# Patient Record
Sex: FEMALE | Race: WHITE | NOT HISPANIC OR LATINO
[De-identification: names, ages, dates, MRNs, and addresses within clinical notes are randomized per-mention and may not be internally consistent; named-entity substitution may affect disease eponyms.]

---

## 2018-04-25 PROBLEM — Z00.00 ENCOUNTER FOR PREVENTIVE HEALTH EXAMINATION: Status: ACTIVE | Noted: 2018-04-25

## 2018-05-10 ENCOUNTER — RECORD ABSTRACTING (OUTPATIENT)
Age: 68
End: 2018-05-10

## 2018-05-10 DIAGNOSIS — Z91.09 OTHER ALLERGY STATUS, OTHER THAN TO DRUGS AND BIOLOGICAL SUBSTANCES: ICD-10-CM

## 2018-05-10 DIAGNOSIS — Z82.49 FAMILY HISTORY OF ISCHEMIC HEART DISEASE AND OTHER DISEASES OF THE CIRCULATORY SYSTEM: ICD-10-CM

## 2018-05-10 DIAGNOSIS — Z86.718 PERSONAL HISTORY OF OTHER VENOUS THROMBOSIS AND EMBOLISM: ICD-10-CM

## 2018-05-10 DIAGNOSIS — Z88.9 ALLERGY STATUS TO UNSPECIFIED DRUGS, MEDICAMENTS AND BIOLOGICAL SUBSTANCES: ICD-10-CM

## 2018-05-10 DIAGNOSIS — S83.209A UNSPECIFIED TEAR OF UNSPECIFIED MENISCUS, CURRENT INJURY, UNSPECIFIED KNEE, INITIAL ENCOUNTER: ICD-10-CM

## 2018-05-22 ENCOUNTER — APPOINTMENT (OUTPATIENT)
Dept: BREAST CENTER | Facility: CLINIC | Age: 68
End: 2018-05-22
Payer: MEDICARE

## 2018-05-22 VITALS
WEIGHT: 240 LBS | HEIGHT: 64 IN | HEART RATE: 70 BPM | SYSTOLIC BLOOD PRESSURE: 162 MMHG | BODY MASS INDEX: 40.97 KG/M2 | DIASTOLIC BLOOD PRESSURE: 74 MMHG

## 2018-05-22 PROCEDURE — 99214 OFFICE O/P EST MOD 30 MIN: CPT

## 2018-08-28 ENCOUNTER — APPOINTMENT (OUTPATIENT)
Dept: BREAST CENTER | Facility: CLINIC | Age: 68
End: 2018-08-28
Payer: MEDICARE

## 2018-08-28 VITALS
DIASTOLIC BLOOD PRESSURE: 78 MMHG | BODY MASS INDEX: 39.15 KG/M2 | WEIGHT: 235 LBS | HEART RATE: 86 BPM | HEIGHT: 65 IN | SYSTOLIC BLOOD PRESSURE: 158 MMHG

## 2018-08-28 PROCEDURE — 99214 OFFICE O/P EST MOD 30 MIN: CPT

## 2018-08-28 RX ORDER — ADHESIVE TAPE 3"X 2.3 YD
50 MCG TAPE, NON-MEDICATED TOPICAL
Refills: 0 | Status: ACTIVE | COMMUNITY

## 2018-08-28 RX ORDER — ANASTROZOLE TABLETS 1 MG/1
1 TABLET ORAL
Qty: 90 | Refills: 0 | Status: ACTIVE | COMMUNITY
Start: 2018-08-06

## 2018-12-18 ENCOUNTER — APPOINTMENT (OUTPATIENT)
Dept: BREAST CENTER | Facility: CLINIC | Age: 68
End: 2018-12-18
Payer: MEDICARE

## 2018-12-18 VITALS
HEART RATE: 82 BPM | HEIGHT: 65 IN | BODY MASS INDEX: 39.99 KG/M2 | DIASTOLIC BLOOD PRESSURE: 81 MMHG | SYSTOLIC BLOOD PRESSURE: 143 MMHG | WEIGHT: 240 LBS

## 2018-12-18 DIAGNOSIS — Z86.79 PERSONAL HISTORY OF OTHER DISEASES OF THE CIRCULATORY SYSTEM: ICD-10-CM

## 2018-12-18 DIAGNOSIS — M19.90 UNSPECIFIED OSTEOARTHRITIS, UNSPECIFIED SITE: ICD-10-CM

## 2018-12-18 DIAGNOSIS — Z82.49 FAMILY HISTORY OF ISCHEMIC HEART DISEASE AND OTHER DISEASES OF THE CIRCULATORY SYSTEM: ICD-10-CM

## 2018-12-18 PROCEDURE — 99214 OFFICE O/P EST MOD 30 MIN: CPT

## 2018-12-18 RX ORDER — IBUPROFEN 800 MG/1
800 TABLET, FILM COATED ORAL
Qty: 30 | Refills: 0 | Status: DISCONTINUED | COMMUNITY
Start: 2018-05-24 | End: 2018-12-18

## 2018-12-18 RX ORDER — SILVER SULFADIAZINE 10 MG/G
1 CREAM TOPICAL
Qty: 85 | Refills: 0 | Status: DISCONTINUED | COMMUNITY
Start: 2018-07-19 | End: 2018-12-18

## 2018-12-18 RX ORDER — COLCHICINE 0.6 MG/1
0.6 TABLET ORAL
Qty: 20 | Refills: 0 | Status: DISCONTINUED | COMMUNITY
Start: 2018-06-16 | End: 2018-12-18

## 2018-12-18 RX ORDER — LIDOCAINE HYDROCHLORIDE 20 MG/ML
2 SOLUTION OROPHARYNGEAL
Qty: 240 | Refills: 0 | Status: DISCONTINUED | COMMUNITY
Start: 2018-05-16 | End: 2018-12-18

## 2018-12-18 RX ORDER — DICLOFENAC SODIUM 10 MG/G
1 GEL TOPICAL
Qty: 300 | Refills: 0 | Status: DISCONTINUED | COMMUNITY
Start: 2018-05-23 | End: 2018-12-18

## 2018-12-18 RX ORDER — TRAMADOL HYDROCHLORIDE 50 MG/1
50 TABLET, COATED ORAL
Qty: 30 | Refills: 0 | Status: DISCONTINUED | COMMUNITY
Start: 2018-05-23 | End: 2018-12-18

## 2018-12-18 RX ORDER — DESLORATADINE 5 MG/1
5 TABLET ORAL
Qty: 30 | Refills: 0 | Status: DISCONTINUED | COMMUNITY
Start: 2018-04-19 | End: 2018-12-18

## 2018-12-18 RX ORDER — METRONIDAZOLE 7.5 MG/G
0.75 CREAM TOPICAL
Qty: 45 | Refills: 0 | Status: DISCONTINUED | COMMUNITY
Start: 2018-05-07 | End: 2018-12-18

## 2019-04-10 ENCOUNTER — APPOINTMENT (OUTPATIENT)
Dept: BREAST CENTER | Facility: CLINIC | Age: 69
End: 2019-04-10
Payer: MEDICARE

## 2019-04-10 VITALS
DIASTOLIC BLOOD PRESSURE: 81 MMHG | WEIGHT: 232 LBS | HEART RATE: 74 BPM | HEIGHT: 65 IN | BODY MASS INDEX: 38.65 KG/M2 | SYSTOLIC BLOOD PRESSURE: 165 MMHG

## 2019-04-10 PROCEDURE — 99214 OFFICE O/P EST MOD 30 MIN: CPT

## 2019-04-10 RX ORDER — KETOCONAZOLE 20 MG/G
2 CREAM TOPICAL
Qty: 45 | Refills: 0 | Status: DISCONTINUED | COMMUNITY
Start: 2018-03-24 | End: 2019-04-10

## 2019-04-10 RX ORDER — TRIAMCINOLONE ACETONIDE 5 MG/G
0.5 OINTMENT TOPICAL
Qty: 30 | Refills: 0 | Status: DISCONTINUED | COMMUNITY
Start: 2018-05-07 | End: 2019-04-10

## 2019-04-10 RX ORDER — CLOTRIMAZOLE AND BETAMETHASONE DIPROPIONATE 10; .5 MG/G; MG/G
1-0.05 CREAM TOPICAL
Qty: 15 | Refills: 0 | Status: DISCONTINUED | COMMUNITY
Start: 2018-05-07 | End: 2019-04-10

## 2019-04-10 RX ORDER — HYDROCORTISONE VALERATE 2 MG/G
0.2 CREAM TOPICAL
Qty: 45 | Refills: 0 | Status: DISCONTINUED | COMMUNITY
Start: 2018-03-24 | End: 2019-04-10

## 2019-04-10 RX ORDER — OLOPATADINE HCL 1 MG/ML
0.1 SOLUTION/ DROPS OPHTHALMIC
Qty: 5 | Refills: 0 | Status: DISCONTINUED | COMMUNITY
Start: 2018-04-19 | End: 2019-04-10

## 2019-04-10 NOTE — PHYSICAL EXAM
[Normocephalic] : normocephalic [Supple] : supple [Atraumatic] : atraumatic [No Cervical Adenopathy] : no cervical adenopathy [No Supraclavicular Adenopathy] : no supraclavicular adenopathy [No dominant masses] : no dominant masses in right breast  [Examined in the supine and seated position] : examined in the supine and seated position [No dominant masses] : no dominant masses left breast [No Nipple Retraction] : no left nipple retraction [No Nipple Discharge] : no left nipple discharge [No Axillary Lymphadenopathy] : no right axillary lymphadenopathy [No Edema] : no edema [No Rashes] : no rashes [No Ulceration] : no ulceration [de-identified] : +diffuesely enlarged gland

## 2019-04-10 NOTE — HISTORY OF PRESENT ILLNESS
[FreeTextEntry1] : S/P L Re-Exc (10/20/17): +1mm resid DCIS, -margins\par S/P L PMX w/ NL/SLN (10/6/17): +1.2cm IDCA, 2.4cm DCIS, SBR II, -LVI, -margins IDCA, <1 mm sup and ant margins DCIS, -0/7 LN, ER+, RI+, Her2 -, Ki67: 40 %\par L Stage IA IDCA\par Completed CMF (Chiqui)(5/18)\par Completed RT (Tinger)(7/18)\par Started Arimidex/ASAb (8/18)\par L Br Ca detected on Scr Mammo (7/12/17)\par S/P L Sono Core Bx ( L 9:00)(7/21/17): +IDCA/DCIS, SBR II, ER+, RI+, Her2 -, Ki67: 24%\par +FH BR CA (Mother 69)\par +FH ? Melanoma (M. Aunt)\par S/P Moh's R temple for BCC (12/18)\par No other MH/FH changes. ROS reviewed/discussed. Taking Vit D. Last Bone Densitometry (7/17): "WNL"\par Mammo/Sono (9/5/18): NSF\par L Mammo/L Sono (4/30/19); NSF\par Thyroid Sono (4/3/19): +diffusely enlarged gland. No masses.

## 2019-08-21 ENCOUNTER — APPOINTMENT (OUTPATIENT)
Dept: BREAST CENTER | Facility: CLINIC | Age: 69
End: 2019-08-21
Payer: COMMERCIAL

## 2019-08-21 VITALS
BODY MASS INDEX: 40.97 KG/M2 | HEART RATE: 87 BPM | HEIGHT: 64 IN | DIASTOLIC BLOOD PRESSURE: 76 MMHG | SYSTOLIC BLOOD PRESSURE: 145 MMHG | WEIGHT: 240 LBS

## 2019-08-21 DIAGNOSIS — Z80.8 FAMILY HISTORY OF MALIGNANT NEOPLASM OF OTHER ORGANS OR SYSTEMS: ICD-10-CM

## 2019-08-21 PROCEDURE — 99214 OFFICE O/P EST MOD 30 MIN: CPT

## 2019-08-21 RX ORDER — ASPIRIN 81 MG/1
81 TABLET, CHEWABLE ORAL DAILY
Refills: 0 | Status: DISCONTINUED | COMMUNITY
End: 2019-08-21

## 2019-08-21 RX ORDER — CELECOXIB 200 MG/1
200 CAPSULE ORAL TWICE DAILY
Refills: 0 | Status: ACTIVE | COMMUNITY

## 2019-08-21 RX ORDER — RAMIPRIL 10 MG/1
10 CAPSULE ORAL DAILY
Refills: 0 | Status: ACTIVE | COMMUNITY

## 2019-08-21 RX ORDER — ASPIRIN 81 MG
81 TABLET, DELAYED RELEASE (ENTERIC COATED) ORAL
Refills: 0 | Status: ACTIVE | COMMUNITY

## 2019-08-21 NOTE — HISTORY OF PRESENT ILLNESS
[FreeTextEntry1] : S/P L Re-Exc (10/20/17): +1mm resid DCIS, -margins\par S/P L PMX w/ NL/SLN (10/6/17): +1.2cm IDCA, 2.4cm DCIS, SBR II, -LVI, -margins IDCA, <1 mm sup and ant margins DCIS, -0/7 LN, ER+, [place]+, Her2 -, Ki67: 40 %\par L Stage IA IDCA\par Completed CMF (Chiqui)(5/18)\par Completed RT (Tinger)(7/18)\par Started Arimidex/ASAb (8/18)\par L Br Ca detected on Scr Mammo (7/12/17)\par S/P L Sono Core Bx ( L 9:00)(7/21/17): +IDCA/DCIS, SBR II, ER+, [place]+, Her2 -, Ki67: 24%\par +FH BR CA (Mother 69)\par +FH ? Melanoma (M. Aunt)\par S/P Moh's R temple for BCC (12/18)\par No other MH/FH changes. ROS reviewed/discussed. Taking Vit D. Last Bone Densitometry (7/19): WNL but sig loss.\par Mammo/Sono (9/5/18): FG, NSF\par L Mammo/L Sono (4/30/19); FG, NSF\par Thyroid Sono (4/3/19): +diffusely enlarged gland. No masses.

## 2019-08-21 NOTE — PHYSICAL EXAM
[Normocephalic] : normocephalic [Atraumatic] : atraumatic [Supple] : supple [No Supraclavicular Adenopathy] : no supraclavicular adenopathy [No Cervical Adenopathy] : no cervical adenopathy [Normal Sinus Rhythm] : normal sinus rhythm [Examined in the supine and seated position] : examined in the supine and seated position [No dominant masses] : no dominant masses in right breast  [No dominant masses] : no dominant masses left breast [No Nipple Retraction] : no left nipple retraction [No Nipple Discharge] : no left nipple discharge [No Axillary Lymphadenopathy] : no left axillary lymphadenopathy [No Edema] : no edema [No Rashes] : no rashes [No Ulceration] : no ulceration [de-identified] : +diffuse thyromegaly [de-identified] : PMX/SLN/RT. NER. %. No lymphedema.

## 2019-12-04 ENCOUNTER — APPOINTMENT (OUTPATIENT)
Dept: BREAST CENTER | Facility: CLINIC | Age: 69
End: 2019-12-04
Payer: MEDICARE

## 2019-12-04 VITALS
HEART RATE: 74 BPM | BODY MASS INDEX: 39.49 KG/M2 | WEIGHT: 237 LBS | DIASTOLIC BLOOD PRESSURE: 77 MMHG | SYSTOLIC BLOOD PRESSURE: 150 MMHG | HEIGHT: 65 IN

## 2019-12-04 PROCEDURE — 99214 OFFICE O/P EST MOD 30 MIN: CPT

## 2019-12-04 NOTE — PHYSICAL EXAM
[Normocephalic] : normocephalic [Atraumatic] : atraumatic [Supple] : supple [No Supraclavicular Adenopathy] : no supraclavicular adenopathy [No Cervical Adenopathy] : no cervical adenopathy [No Thyromegaly] : no thyromegaly [Normal Sinus Rhythm] : normal sinus rhythm [Examined in the supine and seated position] : examined in the supine and seated position [No dominant masses] : no dominant masses in right breast  [No dominant masses] : no dominant masses left breast [No Nipple Retraction] : no left nipple retraction [No Nipple Discharge] : no left nipple discharge [No Axillary Lymphadenopathy] : no left axillary lymphadenopathy [No Edema] : no edema [No Rashes] : no rashes [No Ulceration] : no ulceration [de-identified] : S/P PMX/SLN/RT. NER. %. No lymphedema.

## 2019-12-04 NOTE — HISTORY OF PRESENT ILLNESS
[FreeTextEntry1] : S/P L Re-Exc (10/20/17): +1mm resid DCIS, -margins\par S/P L PMX w/ NL/SLN (10/6/17): +1.2cm IDCA, 2.4cm DCIS, SBR II, -LVI, -margins IDCA, <1 mm sup and ant margins DCIS, -0/7 LN, ER+, [place]+, Her2 -, Ki67: 40 %\par L Stage IA IDCA\par Completed CMF (Chiqui)(5/18)\par Completed RT (Tinger)(7/18)\par Started Arimidex/ASAb (8/18)\par L Br Ca detected on Scr Mammo (7/12/17)\par S/P L Sono Core Bx ( L 9:00)(7/21/17): +IDCA/DCIS, SBR II, ER+, ID+, Her2 -, Ki67: 24%\par +FH BR CA (Mother 69)\par +FH ? Melanoma (M. Aunt)\par S/P Moh's R temple for BCC (12/18)\par No other MH/FH changes. ROS reviewed/discussed. Taking Vit D. Last Bone Densitometry (7/19): WNL but sig loss.\par Mammo/Sono (9/23/19): FG, NSF\par L Mammo/L Sono (4/30/19); FG, NSF\par Thyroid Sono (4/3/19): +diffusely enlarged gland. No masses.

## 2020-04-15 ENCOUNTER — APPOINTMENT (OUTPATIENT)
Dept: BREAST CENTER | Facility: CLINIC | Age: 70
End: 2020-04-15

## 2020-08-30 ENCOUNTER — TRANSCRIPTION ENCOUNTER (OUTPATIENT)
Age: 70
End: 2020-08-30

## 2020-10-22 ENCOUNTER — APPOINTMENT (OUTPATIENT)
Dept: BREAST CENTER | Facility: CLINIC | Age: 70
End: 2020-10-22
Payer: MEDICARE

## 2020-10-22 VITALS — BODY MASS INDEX: 36.65 KG/M2 | WEIGHT: 220 LBS | HEIGHT: 65 IN

## 2020-10-22 PROCEDURE — 99214 OFFICE O/P EST MOD 30 MIN: CPT

## 2020-10-22 NOTE — PHYSICAL EXAM
[Normocephalic] : normocephalic [Atraumatic] : atraumatic [Supple] : supple [No Supraclavicular Adenopathy] : no supraclavicular adenopathy [No Cervical Adenopathy] : no cervical adenopathy [Normal Sinus Rhythm] : normal sinus rhythm [Examined in the supine and seated position] : examined in the supine and seated position [No dominant masses] : no dominant masses in right breast  [No dominant masses] : no dominant masses left breast [No Nipple Retraction] : no left nipple retraction [No Nipple Discharge] : no left nipple discharge [No Axillary Lymphadenopathy] : no left axillary lymphadenopathy [No Edema] : no edema [No Rashes] : no rashes [No Ulceration] : no ulceration [de-identified] : +stable thyromegaly [de-identified] : S/P PMX/SLN/RT. NER. %. No lymphedema.

## 2021-03-03 ENCOUNTER — APPOINTMENT (OUTPATIENT)
Dept: BREAST CENTER | Facility: CLINIC | Age: 71
End: 2021-03-03
Payer: MEDICARE

## 2021-03-03 VITALS
HEART RATE: 76 BPM | WEIGHT: 220 LBS | SYSTOLIC BLOOD PRESSURE: 168 MMHG | DIASTOLIC BLOOD PRESSURE: 79 MMHG | BODY MASS INDEX: 36.65 KG/M2 | HEIGHT: 65 IN

## 2021-03-03 DIAGNOSIS — Z12.31 ENCOUNTER FOR SCREENING MAMMOGRAM FOR MALIGNANT NEOPLASM OF BREAST: ICD-10-CM

## 2021-03-03 DIAGNOSIS — Z87.39 PERSONAL HISTORY OF OTHER DISEASES OF THE MUSCULOSKELETAL SYSTEM AND CONNECTIVE TISSUE: ICD-10-CM

## 2021-03-03 PROCEDURE — 99214 OFFICE O/P EST MOD 30 MIN: CPT

## 2021-03-03 NOTE — PHYSICAL EXAM
[Normocephalic] : normocephalic [Atraumatic] : atraumatic [Supple] : supple [No Supraclavicular Adenopathy] : no supraclavicular adenopathy [No Cervical Adenopathy] : no cervical adenopathy [Normal Sinus Rhythm] : normal sinus rhythm [Examined in the supine and seated position] : examined in the supine and seated position [No dominant masses] : no dominant masses in right breast  [No dominant masses] : no dominant masses left breast [No Nipple Retraction] : no left nipple retraction [No Nipple Discharge] : no left nipple discharge [No Axillary Lymphadenopathy] : no left axillary lymphadenopathy [No Edema] : no edema [No Rashes] : no rashes [No Ulceration] : no ulceration [de-identified] : +diffuse thyromegaly  [de-identified] : S/P PMX/SLN/RT. NER. %. No lymphedema.

## 2021-03-03 NOTE — HISTORY OF PRESENT ILLNESS
[FreeTextEntry1] : S/P L Re-Exc (10/20/17): +1mm resid DCIS, -margins\par S/P L PMX w/ NL/SLN (10/6/17): +1.2cm IDCA, 2.4cm DCIS, SBR II, -LVI, -margins IDCA, <1 mm sup and ant margins DCIS, -0/7 LN, ER+, [place]+, Her2 -, Ki67: 40 %\par L Stage IA IDCA\par Completed CMF (Chiqui)()\par Completed RT (Tinger)()\par Started Arimidex/ASAb ()\par L Br Ca detected on Scr Mammo (17)\par S/P L Sono Core Bx ( L 9:00)(17): +IDCA/DCIS, SBR II, ER+, [place]+, Her2 -, Ki67: 24%\par +FH BR CA (Mother 69)\par +FH ? Melanoma (M. Aunt)\par S/P Moh's R temple for BCC ()\par Pt's   () at 76yo of stroke\par No other MH/FH changes. ROS reviewed/discussed. Taking Vit D. Last Bone Densitometry (): WNL but sig loss.\par Mammo/Sono (10/2/20): FG, NSF\par Thyroid Sono (4/3/19): +diffusely enlarged gland. No masses.

## 2021-06-20 ENCOUNTER — NON-APPOINTMENT (OUTPATIENT)
Age: 71
End: 2021-06-20

## 2021-06-22 ENCOUNTER — APPOINTMENT (OUTPATIENT)
Dept: GASTROENTEROLOGY | Facility: CLINIC | Age: 71
End: 2021-06-22
Payer: MEDICARE

## 2021-06-22 VITALS
BODY MASS INDEX: 36.7 KG/M2 | SYSTOLIC BLOOD PRESSURE: 138 MMHG | HEART RATE: 80 BPM | HEIGHT: 64 IN | WEIGHT: 215 LBS | OXYGEN SATURATION: 98 % | TEMPERATURE: 98.5 F | DIASTOLIC BLOOD PRESSURE: 72 MMHG

## 2021-06-22 DIAGNOSIS — K21.9 GASTRO-ESOPHAGEAL REFLUX DISEASE W/OUT ESOPHAGITIS: ICD-10-CM

## 2021-06-22 DIAGNOSIS — Z12.11 ENCOUNTER FOR SCREENING FOR MALIGNANT NEOPLASM OF COLON: ICD-10-CM

## 2021-06-22 PROCEDURE — 99204 OFFICE O/P NEW MOD 45 MIN: CPT

## 2021-06-22 NOTE — HISTORY OF PRESENT ILLNESS
[FreeTextEntry1] : 71 year old F PMH, htn, gout, OA/knee replacements,on celebrex, rotator cuff surgery, Left breast ca 2017 stage 1 A, s/p lumpectomy, s/p chemo, XRT on anastrozole, s/p TAHBSO for AUB/atypical cells,  presents for evaluation of colon cancer screening. \par \par 3 children\par retired nurse\par \par  passed away last year. , CVA, \par 2 prior colonoscopies \par \par 07/2003- Dr. Cordero-phospho Fleets prep- Diverticulosis and spasm\par \par 09/2011-Dr. Cordero-osmoprep- diverticulosis, 10 year recall. \par \par 2014, ?diverticulitis after popcorn, seen by PCP, took amoxicilin x 6 days, no imaging performed. \par \par more burping after eating x >1-2 months, 3 x per week \par heartburn , responds to tums and famotidine, 1 x per week. \par \par lost 15- 18 lbs intentionally in the last year\par \par Patient denies abdominal pain , n/v,  dysphagia/odynophagia, change in bowel habits, diarrhea, constipation, brbpr, melena. no weight loss.  Good appetite. Patient has daily BM,\par \par fam hx- early stage 1 colon cancer, dx in late 60s, \par she has 5 siblings. \par \par onc- Dr. Steele\par pmd- Valorie Connell, FNP\par

## 2021-06-22 NOTE — PHYSICAL EXAM
[General Appearance - Alert] : alert [General Appearance - In No Acute Distress] : in no acute distress [Sclera] : the sclera and conjunctiva were normal [Outer Ear] : the ears and nose were normal in appearance [Neck Appearance] : the appearance of the neck was normal [] : no respiratory distress [Apical Impulse] : the apical impulse was normal [Abdomen Soft] : soft [Abnormal Walk] : normal gait [FreeTextEntry1] : deferred [Oriented To Time, Place, And Person] : oriented to person, place, and time

## 2021-06-22 NOTE — ASSESSMENT
[FreeTextEntry1] : Screening colonoscopy\par Risks (including but not limited to sedation risks, infection, bleeding, perforation, possibility of missed lesions), benefits and alternatives to procedure, including not doing the procedure, were discussed with patient. Patient understood and agreed to proceed with colonoscopy. \par Colonoscopy preparation instructions reviewed with patient.\par 2 Dulcolax two days prior to procedure + Split MiraLAX/Dulcolax preparation starting day prior to procedure\par \par \par GERD, new symptoms- EGD, r/p esophagitis\par counseled patient on antireflux diet/lifestyle\par famotidine PRN\par \par

## 2021-08-04 ENCOUNTER — APPOINTMENT (OUTPATIENT)
Dept: BREAST CENTER | Facility: CLINIC | Age: 71
End: 2021-08-04
Payer: MEDICARE

## 2021-08-04 VITALS
WEIGHT: 215 LBS | HEIGHT: 64 IN | BODY MASS INDEX: 36.7 KG/M2 | HEART RATE: 78 BPM | SYSTOLIC BLOOD PRESSURE: 151 MMHG | DIASTOLIC BLOOD PRESSURE: 72 MMHG

## 2021-08-04 PROCEDURE — 99214 OFFICE O/P EST MOD 30 MIN: CPT

## 2021-08-04 NOTE — PHYSICAL EXAM
[Normocephalic] : normocephalic [Atraumatic] : atraumatic [Supple] : supple [No Supraclavicular Adenopathy] : no supraclavicular adenopathy [No Cervical Adenopathy] : no cervical adenopathy [No Thyromegaly] : no thyromegaly [Normal Sinus Rhythm] : normal sinus rhythm [Examined in the supine and seated position] : examined in the supine and seated position [No dominant masses] : no dominant masses in right breast  [No dominant masses] : no dominant masses left breast [No Nipple Retraction] : no left nipple retraction [No Nipple Discharge] : no left nipple discharge [No Axillary Lymphadenopathy] : no left axillary lymphadenopathy [No Edema] : no edema [No Rashes] : no rashes [No Ulceration] : no ulceration [de-identified] : S/P PMX/SLN/RT. NER. %. No lymphedema. \par +subinc induration c/e post OP/RT change

## 2021-08-04 NOTE — HISTORY OF PRESENT ILLNESS
[FreeTextEntry1] : S/P L Re-Exc (10/20/17): +1mm resid DCIS, -margins\par S/P L PMX w/ NL/SLN (10/6/17): +1.2cm IDCA, 2.4cm DCIS, SBR II, -LVI, -margins IDCA, <1 mm sup and ant margins DCIS, -0/7 LN, ER+, [place]+, Her2 -, Ki67: 40 %\par L Stage IA IDCA\par Completed CMF (Mitjosse)()\par Completed RT (Tinger)()\par Started Arimidex/ASAb ()\par L Br Ca detected on Scr Mammo (17)\par S/P L Sono Core Bx ( L 9:00)(17): +IDCA/DCIS, SBR II, ER+, [place]+, Her2 -, Ki67: 24%\par +FH Br Ca (Mother 69)\par +FH ? Melanoma (M. Aunt)\par S/P Moh's R temple for BCC ()\par Pt's   () at 74yo of stroke\par Got second Pfizer (3/21)(RUE)\par No other MH/FH changes. ROS reviewed/discussed. Taking Vit D. Last Bone Densitometry (): WNL\par Mammo/Sono (10/2/20): FG, NSF\par Thyroid Sono (4/3/19): +diffusely enlarged gland. No masses.

## 2021-10-25 ENCOUNTER — RESULT REVIEW (OUTPATIENT)
Age: 71
End: 2021-10-25

## 2021-10-27 ENCOUNTER — RESULT REVIEW (OUTPATIENT)
Age: 71
End: 2021-10-27

## 2021-10-28 ENCOUNTER — APPOINTMENT (OUTPATIENT)
Dept: GASTROENTEROLOGY | Facility: HOSPITAL | Age: 71
End: 2021-10-28
Payer: MEDICARE

## 2021-10-28 PROCEDURE — 43239 EGD BIOPSY SINGLE/MULTIPLE: CPT

## 2021-10-28 PROCEDURE — 45380 COLONOSCOPY AND BIOPSY: CPT

## 2021-11-04 ENCOUNTER — NON-APPOINTMENT (OUTPATIENT)
Age: 71
End: 2021-11-04

## 2022-03-23 ENCOUNTER — APPOINTMENT (OUTPATIENT)
Dept: BREAST CENTER | Facility: CLINIC | Age: 72
End: 2022-03-23
Payer: MEDICARE

## 2022-03-23 VITALS
BODY MASS INDEX: 38.41 KG/M2 | DIASTOLIC BLOOD PRESSURE: 74 MMHG | WEIGHT: 225 LBS | SYSTOLIC BLOOD PRESSURE: 151 MMHG | HEART RATE: 75 BPM | HEIGHT: 64 IN

## 2022-03-23 PROCEDURE — 99214 OFFICE O/P EST MOD 30 MIN: CPT

## 2022-03-23 NOTE — PHYSICAL EXAM
[Normocephalic] : normocephalic [Atraumatic] : atraumatic [Supple] : supple [No Supraclavicular Adenopathy] : no supraclavicular adenopathy [No Cervical Adenopathy] : no cervical adenopathy [Normal Sinus Rhythm] : normal sinus rhythm [Examined in the supine and seated position] : examined in the supine and seated position [No dominant masses] : no dominant masses in right breast  [No dominant masses] : no dominant masses left breast [No Nipple Retraction] : no left nipple retraction [No Nipple Discharge] : no left nipple discharge [No Axillary Lymphadenopathy] : no left axillary lymphadenopathy [No Edema] : no edema [No Rashes] : no rashes [No Ulceration] : no ulceration [de-identified] : +diffuse thyromegaly [de-identified] : S/P PMX/SLN/RT. NER. %. No lymphedema. \par +induration L med Br c/w post op/RT change

## 2022-08-29 ENCOUNTER — APPOINTMENT (OUTPATIENT)
Dept: BREAST CENTER | Facility: CLINIC | Age: 72
End: 2022-08-29

## 2022-08-29 VITALS
HEIGHT: 64 IN | WEIGHT: 215 LBS | HEART RATE: 77 BPM | DIASTOLIC BLOOD PRESSURE: 77 MMHG | SYSTOLIC BLOOD PRESSURE: 161 MMHG | BODY MASS INDEX: 36.7 KG/M2

## 2022-08-29 DIAGNOSIS — M75.100 UNSPECIFIED ROTATOR CUFF TEAR OR RUPTURE OF UNSPECIFIED SHOULDER, NOT SPECIFIED AS TRAUMATIC: ICD-10-CM

## 2022-08-29 PROCEDURE — 99214 OFFICE O/P EST MOD 30 MIN: CPT

## 2022-08-29 NOTE — PHYSICAL EXAM
[Normocephalic] : normocephalic [Atraumatic] : atraumatic [Supple] : supple [No Supraclavicular Adenopathy] : no supraclavicular adenopathy [No Cervical Adenopathy] : no cervical adenopathy [Normal Sinus Rhythm] : normal sinus rhythm [Examined in the supine and seated position] : examined in the supine and seated position [No dominant masses] : no dominant masses in right breast  [No dominant masses] : no dominant masses left breast [No Nipple Retraction] : no left nipple retraction [No Nipple Discharge] : no left nipple discharge [No Axillary Lymphadenopathy] : no left axillary lymphadenopathy [No Edema] : no edema [No Rashes] : no rashes [No Ulceration] : no ulceration [de-identified] : +thyromegaly [de-identified] : S/P PMX/SLN/RT. NER. %. No lymphedema. \par +stable induration L LIQ c/w post op/RT change

## 2022-08-29 NOTE — HISTORY OF PRESENT ILLNESS
[FreeTextEntry1] : S/P L Re-Exc (10/20/17): +1mm resid DCIS, -margins\par S/P L PMX w/ NL/SLN (10/6/17): +1.2cm IDCA, 2.4cm DCIS, SBR II, -LVI, -margins IDCA, <1 mm sup and ant margins DCIS, -0/7 LN, ER+, [place]+, Her2 -, Ki67: 40 %\par L Stage IA IDCA\par Completed CMF (Mitjosse)()\par Completed RT (Tinger)()\par Started Arimidex/ASAb ()\par L Br Ca detected on Scr Mammo (17)\par S/P L Sono Core Bx ( L 9:00)(17): +IDCA/DCIS, SBR II, ER+, NE+, Her2 -, Ki67: 24%\par +FH Br Ca (Mother 69)\par +FH ? Melanoma (M. Aunt)\par S/P Moh's R temple for BCC ()\par Pt's   () at 76yo of stroke\par Colonoscopy/EGD (10/21): +polyps\par +R rot cuff tear > +min R shoulder mobility > seeing Ortho\par Got second Pfizer booster ()\par No other MH/FH changes. ROS reviewed/discussed. Taking Vit D. Last Bone Densitometry (): WNL\par Mammo/Sono (11/10/21): FG, NSF\par Thyroid Sono (4/3/19): +diffusely enlarged gland. No masses.

## 2023-02-15 NOTE — HISTORY OF PRESENT ILLNESS
[FreeTextEntry1] : S/P L Re-Exc (10/20/17): +1mm resid DCIS, -margins\par S/P L PMX w/ NL/SLN (10/6/17): +1.2cm IDCA, 2.4cm DCIS, SBR II, -LVI, -margins IDCA, <1 mm sup and ant margins DCIS, -0/7 LN, ER+, [place]+, Her2 -, Ki67: 40 %\par L Stage IA IDCA\par Completed CMF (Chiqui)()\par Completed RT (Tinger)()\par Started Arimidex/ASAb ()\par L Br Ca detected on Scr Mammo (17)\par S/P L Sono Core Bx ( L 9:00)(17): +IDCA/DCIS, SBR II, ER+, [place]+, Her2 -, Ki67: 24%\par +FH Br Ca (Mother 69)\par +FH ? Melanoma (M. Aunt)\par S/P Moh's R temple for BCC ()\par Pt's   () at 76yo of stroke\par Colonoscopy/EGD (10/21): +polyps\par Got Pfizer booster (10/21)(RUE)\par No other MH/FH changes. ROS reviewed/discussed. Taking Vit D. Last Bone Densitometry (): WNL\par Mammo/Sono (11/10/21): FG, NSF\par Thyroid Sono (4/3/19): +diffusely enlarged gland. No masses. Detail Level: Detailed Additional Notes: No flu vaccination for personal reasons Quality 110: Preventive Care And Screening: Influenza Immunization: Influenza Immunization not Administered for Documented Reasons.

## 2023-04-13 ENCOUNTER — APPOINTMENT (OUTPATIENT)
Dept: BREAST CENTER | Facility: CLINIC | Age: 73
End: 2023-04-13
Payer: MEDICARE

## 2023-04-13 VITALS
WEIGHT: 212 LBS | BODY MASS INDEX: 37.56 KG/M2 | HEIGHT: 63 IN | HEART RATE: 70 BPM | DIASTOLIC BLOOD PRESSURE: 70 MMHG | SYSTOLIC BLOOD PRESSURE: 145 MMHG

## 2023-04-13 DIAGNOSIS — N64.1 FAT NECROSIS OF BREAST: ICD-10-CM

## 2023-04-13 DIAGNOSIS — Z85.828 PERSONAL HISTORY OF OTHER MALIGNANT NEOPLASM OF SKIN: ICD-10-CM

## 2023-04-13 PROCEDURE — 99214 OFFICE O/P EST MOD 30 MIN: CPT

## 2023-04-13 NOTE — PHYSICAL EXAM
[Normocephalic] : normocephalic [Atraumatic] : atraumatic [Supple] : supple [No Supraclavicular Adenopathy] : no supraclavicular adenopathy [No Cervical Adenopathy] : no cervical adenopathy [Normal Sinus Rhythm] : normal sinus rhythm [Examined in the supine and seated position] : examined in the supine and seated position [No dominant masses] : no dominant masses in right breast  [No dominant masses] : no dominant masses left breast [No Nipple Retraction] : no left nipple retraction [No Nipple Discharge] : no left nipple discharge [No Axillary Lymphadenopathy] : no left axillary lymphadenopathy [No Edema] : no edema [No Rashes] : no rashes [No Ulceration] : no ulceration [de-identified] : +thyromegaly > stable [de-identified] : +FC tissue\par NSF  [de-identified] : S/P PMX/SLN/RT. NER. %. No lymphedema. \par +stable med induration

## 2023-04-13 NOTE — REVIEW OF SYSTEMS
[Recent Weight Loss (___ Lbs)] : recent [unfilled] ~Ulb weight loss [Heartburn] : heartburn [Arthralgias] : arthralgias [Negative] : Heme/Lymph

## 2023-04-13 NOTE — HISTORY OF PRESENT ILLNESS
[FreeTextEntry1] : S/P L Re-Exc (10/20/17): +1mm resid DCIS, -margins\par S/P L PMX w/ NL/SLN (10/6/17): +1.2cm IDCA, 2.4cm DCIS, SBR II, -LVI, -margins IDCA, <1 mm sup and ant margins DCIS, -0/7 LN, ER+, NE+, Her2 -, Ki67: 40 %\par L Stage IA IDCA\par Completed CMF (Chiqui)()\par Completed RT (Tinger)()\par Started Arimidex/ASAb ()\par L Br Ca detected on Scr Mammo (17)\par S/P L Sono Core Bx ( L 9:00)(17): +IDCA/DCIS, SBR II, ER+, NE+, Her2 -, Ki67: 24%\par +FH Br Ca (Mother 69)\par +FH ? Melanoma (M. Aunt)\par S/P Moh's R temple for BCC ()\par Pt's   () at 76yo of stroke\par Colonoscopy/EGD (10/21): +polyps\par +R rot cuff tear > +min R shoulder mobility > seeing Ortho\par Got second Pfizer booster ()\par No other MH/FH changes. ROS reviewed/discussed. Taking Vit D. Last Bone Densitometry (): WNL\par Mammo/Sono (22): FG, NSF\par Thyroid Sono (4/3/19): +diffusely enlarged gland. No masses.

## 2023-09-29 NOTE — HISTORY OF PRESENT ILLNESS
[FreeTextEntry1] : S/P L Re-Exc (10/20/17): +1mm resid DCIS, -margins\par S/P L PMX w/ NL/SLN (10/6/17): +1.2cm IDCA, 2.4cm DCIS, SBR II, -LVI, -margins IDCA, <1 mm sup and ant margins DCIS, -0/7 LN, ER+, [place]+, Her2 -, Ki67: 40 %\par L Stage IA IDCA\par Completed CMF (Chiqui)()\par Completed RT (Tinger)()\par Started Arimidex/ASAb ()\par L Br Ca detected on Scr Mammo (17)\par S/P L Sono Core Bx ( L 9:00)(17): +IDCA/DCIS, SBR II, ER+, [place]+, Her2 -, Ki67: 24%\par +FH BR CA (Mother 69)\par +FH ? Melanoma (M. Aunt)\par S/P Moh's R temple for BCC ()\par Pt's   () at 76yo of stroke\par No other MH/FH changes. ROS reviewed/discussed. Taking Vit D. Last Bone Densitometry (): WNL but sig loss.\par Mammo/Sono (10/2/20): FG, NSF\par Thyroid Sono (4/3/19): +diffusely enlarged gland. No masses. Normal for race

## 2024-01-16 ENCOUNTER — NON-APPOINTMENT (OUTPATIENT)
Age: 74
End: 2024-01-16

## 2024-01-17 ENCOUNTER — APPOINTMENT (OUTPATIENT)
Dept: BREAST CENTER | Facility: CLINIC | Age: 74
End: 2024-01-17
Payer: MEDICARE

## 2024-01-17 VITALS
BODY MASS INDEX: 38.28 KG/M2 | DIASTOLIC BLOOD PRESSURE: 77 MMHG | HEIGHT: 62 IN | SYSTOLIC BLOOD PRESSURE: 182 MMHG | HEART RATE: 63 BPM | WEIGHT: 208 LBS

## 2024-01-17 DIAGNOSIS — Z92.21 PERSONAL HISTORY OF ANTINEOPLASTIC CHEMOTHERAPY: ICD-10-CM

## 2024-01-17 DIAGNOSIS — Z80.3 FAMILY HISTORY OF MALIGNANT NEOPLASM OF BREAST: ICD-10-CM

## 2024-01-17 DIAGNOSIS — E01.0 IODINE-DEFICIENCY RELATED DIFFUSE (ENDEMIC) GOITER: ICD-10-CM

## 2024-01-17 DIAGNOSIS — E66.9 OBESITY, UNSPECIFIED: ICD-10-CM

## 2024-01-17 DIAGNOSIS — C50.912 MALIGNANT NEOPLASM OF UNSPECIFIED SITE OF LEFT FEMALE BREAST: ICD-10-CM

## 2024-01-17 DIAGNOSIS — Z86.010 PERSONAL HISTORY OF COLONIC POLYPS: ICD-10-CM

## 2024-01-17 DIAGNOSIS — D05.12 INTRADUCTAL CARCINOMA IN SITU OF LEFT BREAST: ICD-10-CM

## 2024-01-17 DIAGNOSIS — Z92.3 PERSONAL HISTORY OF IRRADIATION: ICD-10-CM

## 2024-01-17 PROCEDURE — 99214 OFFICE O/P EST MOD 30 MIN: CPT

## 2024-01-17 RX ORDER — BIOTIN 10 MG
TABLET ORAL
Refills: 0 | Status: DISCONTINUED | COMMUNITY
End: 2024-01-17

## 2024-01-17 RX ORDER — METOPROLOL SUCCINATE 50 MG/1
50 TABLET, EXTENDED RELEASE ORAL
Refills: 0 | Status: ACTIVE | COMMUNITY

## 2024-01-17 NOTE — REVIEW OF SYSTEMS
[Heartburn] : heartburn [Arthralgias] : arthralgias [Hot Flashes] : hot flashes [Negative] : Heme/Lymph

## 2024-01-17 NOTE — PHYSICAL EXAM
[Normocephalic] : normocephalic [Atraumatic] : atraumatic [Supple] : supple [No Supraclavicular Adenopathy] : no supraclavicular adenopathy [No Cervical Adenopathy] : no cervical adenopathy [Normal Sinus Rhythm] : normal sinus rhythm [Examined in the supine and seated position] : examined in the supine and seated position [No dominant masses] : no dominant masses in right breast  [No dominant masses] : no dominant masses left breast [No Nipple Retraction] : no left nipple retraction [No Nipple Discharge] : no left nipple discharge [No Axillary Lymphadenopathy] : no left axillary lymphadenopathy [No Edema] : no edema [No Rashes] : no rashes [No Ulceration] : no ulceration [de-identified] : +thyromegaly [de-identified] : +FC tissue NSF  [de-identified] : S/P PMX/SLN/RT. NER. %. No lymphedema.

## 2024-01-17 NOTE — HISTORY OF PRESENT ILLNESS
[FreeTextEntry1] : S/P L Re-Exc (10/20/17): +1mm resid DCIS, -margins S/P L PMX w/ NL/SLN (10/6/17): +1.2cm IDCA, 2.4cm DCIS, SBR II, -LVI, -margins IDCA, <1 mm sup and ant margins DCIS, -0/7 LN, ER+, [place]+, Her2 -, Ki67: 40 % L Stage IA IDCA Completed CMF (Chiqui)() Completed RT (Tinger)() Started Arimidex/ASAb () L Br Ca detected on Scr Mammo (17) S/P L Sono Core Bx ( L 9:00)(17): +IDCA/DCIS, SBR II, ER+, [place]+, Her2 -, Ki67: 24% +FH Br Ca (Mother 69) +FH ? Melanoma (M. Aunt) S/P Moh's R temple for BCC () Pt's   () at 76yo of stroke Colonoscopy/EGD (10/21): +polyps PAP/Pelvic (): "WNL"  +R rot cuff tear > +min R shoulder mobility > seeing Ortho Got second Pfizer booster () No other MH/FH changes. ROS reviewed/discussed. Taking Vit D. Last Bone Densitometry (): WNL Mammo/Sono (23): FG, NSF Thyroid Sono (4/3/19): +diffusely enlarged gland. No masses.

## 2024-03-25 ENCOUNTER — APPOINTMENT (OUTPATIENT)
Dept: VASCULAR SURGERY | Facility: CLINIC | Age: 74
End: 2024-03-25
Payer: MEDICARE

## 2024-03-25 VITALS — HEIGHT: 62 IN | BODY MASS INDEX: 37.73 KG/M2 | WEIGHT: 205 LBS

## 2024-03-25 DIAGNOSIS — I87.2 VENOUS INSUFFICIENCY (CHRONIC) (PERIPHERAL): ICD-10-CM

## 2024-03-25 DIAGNOSIS — I83.891 VARICOSE VEINS OF RIGHT LOWER EXTREMITY WITH OTHER COMPLICATIONS: ICD-10-CM

## 2024-03-25 PROCEDURE — 99203 OFFICE O/P NEW LOW 30 MIN: CPT

## 2024-03-26 PROBLEM — I87.2 VENOUS INSUFFICIENCY OF BOTH LOWER EXTREMITIES: Status: ACTIVE | Noted: 2024-03-26

## 2024-03-26 PROBLEM — I83.891 SYMPTOMATIC VARICOSE VEINS OF RIGHT LOWER EXTREMITY: Status: ACTIVE | Noted: 2024-03-26

## 2024-03-26 NOTE — ASSESSMENT
[FreeTextEntry1] : 74-year-old female with a history of venous insufficiency.  She is status post ablations of bilateral greater saphenous veins.  She has painful varicose veins of the right lateral leg despite wearing compression stockings.  I believe she has an excellent candidate for stab phlebectomy.  I think it is reasonable for her to undergo a left knee replacement and to fully recover prior to undergoing elective venous surgery.  She will follow-up with me after her left knee replacement.

## 2024-03-26 NOTE — HISTORY OF PRESENT ILLNESS
[FreeTextEntry1] : 74-year-old female presents to the office with a chief complaint of painful varicose veins.  The patient reports that she is status post multiple venous procedures in the past at [facility].  She reports that she was seen in follow-up at [facility] and was unhappy with the care.  She presents for vascular evaluation.  She reports that her right lower extremity varicose veins are extremely painful despite wearing compression stockings.  She is scheduled for a left knee replacement and would like to undergo venous surgery after she recovers.  She denies history of deep or superficial venous thrombosis.

## 2024-03-26 NOTE — PHYSICAL EXAM
[JVD] : no jugular venous distention  [Normal Breath Sounds] : Normal breath sounds [2+] : left 2+ [Ankle Swelling (On Exam)] : present [Ankle Swelling Bilaterally] : bilaterally  [Varicose Veins Of Lower Extremities] : present [Varicose Veins Of The Right Leg] : of the right leg [Ankle Swelling On The Left] : moderate [] : bilaterally [Ankle Swelling On The Right] : mild [Alert] : alert [Oriented to Person] : oriented to person [Oriented to Place] : oriented to place [Oriented to Time] : oriented to time [de-identified] : Awake and Alert [de-identified] : varicose veins > 3mm right lateral leg [de-identified] : Appropriate

## 2024-03-26 NOTE — REVIEW OF SYSTEMS
[Fever] : no fever [Chills] : no chills [Lower Ext Edema] : lower extremity edema [Joint Stiffness] : joint stiffness [Joint Swelling] : joint swelling [Limb Pain] : limb pain [Limb Swelling] : limb swelling

## 2024-06-01 ENCOUNTER — TRANSCRIPTION ENCOUNTER (OUTPATIENT)
Age: 74
End: 2024-06-01

## 2024-10-16 ENCOUNTER — APPOINTMENT (OUTPATIENT)
Dept: BREAST CENTER | Facility: CLINIC | Age: 74
End: 2024-10-16
Payer: MEDICARE

## 2024-10-16 VITALS
BODY MASS INDEX: 34.6 KG/M2 | SYSTOLIC BLOOD PRESSURE: 176 MMHG | HEART RATE: 67 BPM | WEIGHT: 188 LBS | HEIGHT: 62 IN | DIASTOLIC BLOOD PRESSURE: 72 MMHG

## 2024-10-16 DIAGNOSIS — Z86.0100 PERSONAL HISTORY OF COLON POLYPS, UNSPECIFIED: ICD-10-CM

## 2024-10-16 DIAGNOSIS — Z63.4 DISAPPEARANCE AND DEATH OF FAMILY MEMBER: ICD-10-CM

## 2024-10-16 DIAGNOSIS — Z92.21 PERSONAL HISTORY OF ANTINEOPLASTIC CHEMOTHERAPY: ICD-10-CM

## 2024-10-16 DIAGNOSIS — Z80.3 FAMILY HISTORY OF MALIGNANT NEOPLASM OF BREAST: ICD-10-CM

## 2024-10-16 DIAGNOSIS — C50.912 MALIGNANT NEOPLASM OF UNSPECIFIED SITE OF LEFT FEMALE BREAST: ICD-10-CM

## 2024-10-16 DIAGNOSIS — Z92.3 PERSONAL HISTORY OF IRRADIATION: ICD-10-CM

## 2024-10-16 DIAGNOSIS — D05.12 INTRADUCTAL CARCINOMA IN SITU OF LEFT BREAST: ICD-10-CM

## 2024-10-16 PROCEDURE — 99213 OFFICE O/P EST LOW 20 MIN: CPT

## 2024-10-16 SDOH — SOCIAL STABILITY - SOCIAL INSECURITY: DISSAPEARANCE AND DEATH OF FAMILY MEMBER: Z63.4

## 2025-04-29 ENCOUNTER — NON-APPOINTMENT (OUTPATIENT)
Age: 75
End: 2025-04-29

## 2025-04-30 ENCOUNTER — APPOINTMENT (OUTPATIENT)
Dept: BREAST CENTER | Facility: CLINIC | Age: 75
End: 2025-04-30

## 2025-04-30 VITALS
HEART RATE: 66 BPM | WEIGHT: 184 LBS | DIASTOLIC BLOOD PRESSURE: 77 MMHG | HEIGHT: 62 IN | SYSTOLIC BLOOD PRESSURE: 185 MMHG | BODY MASS INDEX: 33.86 KG/M2

## 2025-04-30 DIAGNOSIS — I78.1 NEVUS, NON-NEOPLASTIC: ICD-10-CM

## 2025-04-30 DIAGNOSIS — E01.0 IODINE-DEFICIENCY RELATED DIFFUSE (ENDEMIC) GOITER: ICD-10-CM

## 2025-04-30 DIAGNOSIS — Z80.3 FAMILY HISTORY OF MALIGNANT NEOPLASM OF BREAST: ICD-10-CM

## 2025-04-30 DIAGNOSIS — Z92.3 PERSONAL HISTORY OF IRRADIATION: ICD-10-CM

## 2025-04-30 DIAGNOSIS — D05.12 INTRADUCTAL CARCINOMA IN SITU OF LEFT BREAST: ICD-10-CM

## 2025-04-30 DIAGNOSIS — C50.912 MALIGNANT NEOPLASM OF UNSPECIFIED SITE OF LEFT FEMALE BREAST: ICD-10-CM

## 2025-04-30 DIAGNOSIS — Z92.21 PERSONAL HISTORY OF ANTINEOPLASTIC CHEMOTHERAPY: ICD-10-CM

## 2025-04-30 DIAGNOSIS — Z86.0100 PERSONAL HISTORY OF COLON POLYPS, UNSPECIFIED: ICD-10-CM

## 2025-04-30 PROCEDURE — 99213 OFFICE O/P EST LOW 20 MIN: CPT

## 2025-04-30 RX ORDER — RAMIPRIL 10 MG/1
10 CAPSULE ORAL
Refills: 0 | Status: ACTIVE | COMMUNITY

## 2025-07-21 ENCOUNTER — APPOINTMENT (OUTPATIENT)
Dept: VASCULAR SURGERY | Facility: CLINIC | Age: 75
End: 2025-07-21
Payer: MEDICARE

## 2025-07-21 VITALS — WEIGHT: 188 LBS | BODY MASS INDEX: 34.6 KG/M2 | HEIGHT: 62 IN

## 2025-07-21 DIAGNOSIS — I87.2 VENOUS INSUFFICIENCY (CHRONIC) (PERIPHERAL): ICD-10-CM

## 2025-07-21 PROCEDURE — 99214 OFFICE O/P EST MOD 30 MIN: CPT

## 2025-08-18 ENCOUNTER — APPOINTMENT (OUTPATIENT)
Dept: VASCULAR SURGERY | Facility: CLINIC | Age: 75
End: 2025-08-18
Payer: MEDICARE

## 2025-08-18 DIAGNOSIS — I87.2 VENOUS INSUFFICIENCY (CHRONIC) (PERIPHERAL): ICD-10-CM

## 2025-08-18 DIAGNOSIS — I83.891 VARICOSE VEINS OF RIGHT LOWER EXTREMITY WITH OTHER COMPLICATIONS: ICD-10-CM

## 2025-08-18 PROCEDURE — 93970 EXTREMITY STUDY: CPT

## 2025-08-18 PROCEDURE — 99214 OFFICE O/P EST MOD 30 MIN: CPT
